# Patient Record
Sex: MALE | Race: WHITE
[De-identification: names, ages, dates, MRNs, and addresses within clinical notes are randomized per-mention and may not be internally consistent; named-entity substitution may affect disease eponyms.]

---
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
## 2020-09-06 ENCOUNTER — EMERGENCY (EMERGENCY)
Facility: HOSPITAL | Age: 20
LOS: 0 days | Discharge: HOME | End: 2020-09-06
Attending: EMERGENCY MEDICINE
Payer: COMMERCIAL

## 2020-09-06 VITALS
TEMPERATURE: 103 F | DIASTOLIC BLOOD PRESSURE: 50 MMHG | RESPIRATION RATE: 18 BRPM | HEART RATE: 100 BPM | OXYGEN SATURATION: 98 % | SYSTOLIC BLOOD PRESSURE: 118 MMHG

## 2020-09-06 DIAGNOSIS — B34.9 VIRAL INFECTION, UNSPECIFIED: ICD-10-CM

## 2020-09-06 DIAGNOSIS — R51 HEADACHE: ICD-10-CM

## 2020-09-06 DIAGNOSIS — R50.9 FEVER, UNSPECIFIED: ICD-10-CM

## 2020-09-06 DIAGNOSIS — J02.9 ACUTE PHARYNGITIS, UNSPECIFIED: ICD-10-CM

## 2020-09-06 PROCEDURE — 99283 EMERGENCY DEPT VISIT LOW MDM: CPT

## 2020-09-06 PROCEDURE — U0003: CPT

## 2020-09-06 RX ORDER — IBUPROFEN 200 MG
600 TABLET ORAL ONCE
Refills: 0 | Status: COMPLETED | OUTPATIENT
Start: 2020-09-06 | End: 2020-09-06

## 2020-09-06 RX ADMIN — Medication 600 MILLIGRAM(S): at 15:34

## 2020-09-06 NOTE — ED STATDOCS - CLINICAL SUMMARY MEDICAL DECISION MAKING FREE TEXT BOX
Pt febrile, HR, 100, expected given fever. Pt well appearing. Pt does not require lab workup at this time. Pt states he is only here for COVID swab. Offered urine, pt would like to go home. Pt understands indications to return. Pt febrile, HR, 100, expected given fever. Pt well appearing. Pt does not require lab workup at this time. Pt states he is only here for COVID swab. Offered urine, but pt would like to go home. Pt understands indications to return.  D/c home with strict return precautions and prompt outpatient f/u.

## 2020-09-06 NOTE — ED STATDOCS - OBJECTIVE STATEMENT
21 y/o male with no significant PMHx presents to the ED c/o fever, RING since yesterday. HA resolved. Pt had a sore throat last week, was put on abx. Denies cough, HA, neck stiffness, sick contacts. Pt was in car swab line but was found to have a fever to 102F.    Pt febrile, HR, 100, expected given fever. Pt well appearing. Pt does not require lab workup at this time. Pt states he is only here for COVID swab. Offered urine, pt would like to go home. Pt understands indications to return.

## 2020-09-06 NOTE — ED STATDOCS - PATIENT PORTAL LINK FT
You can access the FollowMyHealth Patient Portal offered by [facility] by registering at the following website: [email or web address]. By joining [facility]’s FollowMyHealth portal, you will also be able to view your health information using other applications (apps) compatible with our system.

## 2020-09-07 LAB — SARS-COV-2 RNA SPEC QL NAA+PROBE: SIGNIFICANT CHANGE UP

## 2023-01-05 NOTE — ED STATDOCS - CPE ED EYE NORM
What Type Of Note Output Would You Prefer (Optional)?: Standard Output Is The Patient Presenting As Previously Scheduled?: No, they are coming in before their scheduled appointment How Severe Is Your Rash?: mild Is This A New Presentation, Or A Follow-Up?: Rash bilateral normal...